# Patient Record
Sex: FEMALE | Race: WHITE
[De-identification: names, ages, dates, MRNs, and addresses within clinical notes are randomized per-mention and may not be internally consistent; named-entity substitution may affect disease eponyms.]

---

## 2019-03-13 ENCOUNTER — HOSPITAL ENCOUNTER (EMERGENCY)
Dept: HOSPITAL 31 - C.ER | Age: 44
Discharge: HOME | End: 2019-03-13
Payer: MEDICAID

## 2019-03-13 VITALS
DIASTOLIC BLOOD PRESSURE: 80 MMHG | SYSTOLIC BLOOD PRESSURE: 118 MMHG | TEMPERATURE: 98.2 F | RESPIRATION RATE: 18 BRPM | HEART RATE: 80 BPM

## 2019-03-13 VITALS — BODY MASS INDEX: 35.1 KG/M2

## 2019-03-13 VITALS — OXYGEN SATURATION: 100 %

## 2019-03-13 DIAGNOSIS — M54.5: Primary | ICD-10-CM

## 2019-03-13 PROCEDURE — 72100 X-RAY EXAM L-S SPINE 2/3 VWS: CPT

## 2019-03-13 PROCEDURE — 99284 EMERGENCY DEPT VISIT MOD MDM: CPT

## 2019-03-13 PROCEDURE — 96372 THER/PROPH/DIAG INJ SC/IM: CPT

## 2019-03-13 PROCEDURE — 81025 URINE PREGNANCY TEST: CPT

## 2019-03-13 NOTE — C.PDOC
History Of Present Illness


44 y/o female presents to the ER complaining of low back pain which has been 

present for the past 5 days. Patient states that she "twisted" herself to get 

something that was behind her developing pain in low back. Patient reports that 

the pain radiates down the legs. Denies having bowel/bladder incontinence, 

dysuria, and hematuria.


Time Seen by Provider: 03/13/19 18:13


Chief Complaint (Nursing): Back Pain


History Per: Patient


History/Exam Limitations: no limitations


Onset/Duration Of Symptoms: Days


Current Symptoms Are (Timing): Still Present


Severity: Moderate





Past Medical History


Reviewed: Historical Data, Nursing Documentation, Vital Signs


Vital Signs: 





                                Last Vital Signs











Temp  98.6 F   03/13/19 17:57


 


Pulse  89   03/13/19 17:57


 


Resp  17   03/13/19 17:57


 


BP  125/84   03/13/19 17:57


 


Pulse Ox  100   03/13/19 17:57














- Medical History


PMH: No Chronic Diseases


Surgical History: No Surg Hx





- [facility] Procedures











MANUAL ASSIST SA[facility] (01/25/15)








Family History: States: No Known Family Hx





- Social History


Hx Alcohol Use: No


Hx Substance Use: No





- Immunization History


Hx Tetanus Toxoid Vaccination: No


Hx Influenza Vaccination: Yes


Hx Pneumococcal Vaccination: No





Review Of Systems


Except As Marked, All Systems Reviewed And Found Negative.


Genitourinary: Negative for: Dysuria, Incontinence, Hematuria


Musculoskeletal: Positive for: Back Pain


Neurological: Negative for: Weakness, Numbness





Physical Exam





- Physical Exam


Appears: Non-toxic, No Acute Distress


Skin: Normal Color, Warm, Dry


Head: Atraumatic, Normacephalic


Eye(s): bilateral: Normal Inspection


Nose: Normal


Oral Mucosa: Moist


Neck: Supple


Chest: Symmetrical


Cardiovascular: Rhythm Regular


Respiratory: Normal Breath Sounds, No Rales, No Rhonchi, No Wheezing


Gastrointestinal/Abdominal: Normal Exam, Soft, No Tenderness, No Guarding, No 

Rebound


Back: Other (tenderness in lumbar spine)


Extremity: Normal ROM, Tenderness (tenderness to posterior aspect of bilateral 

buttocks), No Swelling


Neurological/Psych: Oriented x3, Normal Speech





ED Course And Treatment


O2 Sat by Pulse Oximetry: 100 (RA)


Pulse Ox Interpretation: Normal


Progress Note: X-Ray-Lumbar Spine ordered. Patient treated with Toradol IM and 

Valium PO. Lidoderm Patch has been applied to patient. On re-evaluation patient 

feels better, ambulatory, no neuro deficit. Patient is stable to be d/c home 

with Clinic follow up.





Disposition





- Disposition


Referrals: 


[facility] [Outside]


Disposition: HOME/ ROUTINE


Disposition Time: 22:03


Condition: STABLE


Additional Instructions: 


Follow up in clinic within 1-2 days. Return to ED if feel worse.


Prescriptions: 


Lidocaine 5% [Lidoderm] 1 patch TP DAILY #30 patch


Ibuprofen [Motrin Tab] 600 mg PO Q8 #30 tab


predniSONE [predniSONE Tab] 2 tab PO DAILY #8 tab


diaZEpam [Valium] 2 mg PO TID #15 tab


Instructions:  Low Back Pain in Adults


Forms:  [facility] (English), Work Excuse


Print Language: Georgian





- Clinical Impression


Clinical Impression: 


 Low back pain








- PA / NP / Resident Statement


MD/DO has reviewed & agrees with the documentation as recorded.





- Scribe Statement


The provider has reviewed the documentation as recorded by the [facility]





Provider Attestation





All medical record entries made by the Gaganibe were at my direction and 

personally dictated by me. I have reviewed the chart and agree that the record 

accurately reflects my personal performance of the history, physical exam, medic

al decision making, and the department course for this patient. I have also 

personally directed, reviewed, and agree with the discharge instructions and 

disposition.

## 2019-03-14 NOTE — RAD
Date of service: 



03/13/2019



PROCEDURE:  Radiographs of the Lumbar Spine.



HISTORY:

sciatica







COMPARISON:

No prior.



FINDINGS:



BONES:

Normal alignment. No listhesis. No fracture.



DISC SPACES:

Unremarkable.



OTHER FINDINGS:

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  Pubic symphyseal 

joint unremarkable.



A 1.5 cm rounded opacity projects over the left 11th rib on series 1, 

image 1 the frontal view it is of unclear significance, if any.  Its 

even unclear if this is intrinsic or and extrinsic to the patient.  

No rib destruction here is associated with it. 



Trace right hip osteoarthrosis also suggested. 



IMPRESSION:

No vertebral body fracture.  No gross subluxation.



Bilateral sacroiliac joint sclerotic arthrosisOther findings as 

above.